# Patient Record
Sex: FEMALE | Race: WHITE | NOT HISPANIC OR LATINO
[De-identification: names, ages, dates, MRNs, and addresses within clinical notes are randomized per-mention and may not be internally consistent; named-entity substitution may affect disease eponyms.]

---

## 2022-06-27 ENCOUNTER — APPOINTMENT (OUTPATIENT)
Dept: ORTHOPEDIC SURGERY | Facility: CLINIC | Age: 82
End: 2022-06-27

## 2022-06-27 DIAGNOSIS — Z63.5 DISRUPTION OF FAMILY BY SEPARATION AND DIVORCE: ICD-10-CM

## 2022-06-27 DIAGNOSIS — Z78.9 OTHER SPECIFIED HEALTH STATUS: ICD-10-CM

## 2022-06-27 DIAGNOSIS — Z86.79 PERSONAL HISTORY OF OTHER DISEASES OF THE CIRCULATORY SYSTEM: ICD-10-CM

## 2022-06-27 DIAGNOSIS — Z86.39 PERSONAL HISTORY OF OTHER ENDOCRINE, NUTRITIONAL AND METABOLIC DISEASE: ICD-10-CM

## 2022-06-27 PROBLEM — Z00.00 ENCOUNTER FOR PREVENTIVE HEALTH EXAMINATION: Status: ACTIVE | Noted: 2022-06-27

## 2022-06-27 PROCEDURE — 73030 X-RAY EXAM OF SHOULDER: CPT | Mod: LT

## 2022-06-27 PROCEDURE — 20610 DRAIN/INJ JOINT/BURSA W/O US: CPT | Mod: LT

## 2022-06-27 PROCEDURE — 99213 OFFICE O/P EST LOW 20 MIN: CPT | Mod: 25

## 2022-06-27 SDOH — SOCIAL STABILITY - SOCIAL INSECURITY: DISRUPTION OF FAMILY BY SEPARATION AND DIVORCE: Z63.5

## 2022-06-27 NOTE — DISCUSSION/SUMMARY
[de-identified] : Discussed NSAIDS, oral medrol, PT, CSI GH, Surgery\par Patient understands if cortisone injection is administered, total joint arthroplasty will need to be delayed  4 months. \par \par Plan is to go forward with CSI today in the left shoulder.\par \par f/u 1 month

## 2022-06-27 NOTE — HISTORY OF PRESENT ILLNESS
[de-identified] : Patient is here today for the left shoulder. SHe states she fell 2 days ago.  Patient has had issues with the shoulder in the past and a few weeks ago she was lifting a bag and noted pain. She notes pain anterior and posterior shoulder.  IN the past she had PT for her shoulder.  She states she has taken NSAIDS in the past and had a H/A as a result.

## 2022-06-27 NOTE — PHYSICAL EXAM
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  [TWNoteComboBox7] : active forward flexion 155 degrees [de-identified] : active abduction 110 degrees [TWNoteComboBox6] : internal rotation L1 [de-identified] : external rotation 30 degrees

## 2022-06-27 NOTE — PROCEDURE
[Large Joint Injection] : Large joint injection [Left] : of the left [Subacromial Space] : subacromial space [Pain] : pain [Inflammation] : inflammation [X-ray evidence of Osteoarthritis on this or prior visit] : x-ray evidence of Osteoarthritis on this or prior visit [Betadine] : betadine [Sterile technique used] : sterile technique used [___ cc    6mg] :  Betamethasone (Celestone) ~Vcc of 6mg [___ cc    1%] : Lidocaine ~Vcc of 1%  [] : Patient tolerated procedure well [Call if redness, pain or fever occur] : call if redness, pain or fever occur [Apply ice for 15min out of every hour for the next 12-24 hours as tolerated] : apply ice for 15 minutes out of every hour for the next 12-24 hours as tolerated [Patient was advised to rest the joint(s) for ____ days] : patient was advised to rest the joint(s) for [unfilled] days [Previous OTC use and PT nontherapeutic] : patient has tried OTC's including aspirin, Ibuprofen, Aleve, etc or prescription NSAIDS, and/or exercises at home and/or physical therapy without satisfactory response [Patient had decreased mobility in the joint] : patient had decreased mobility in the joint [Risks, benefits, alternatives discussed / Verbal consent obtained] : the risks benefits, and alternatives have been discussed, and verbal consent was obtained

## 2022-07-27 ENCOUNTER — APPOINTMENT (OUTPATIENT)
Dept: ORTHOPEDIC SURGERY | Facility: CLINIC | Age: 82
End: 2022-07-27

## 2022-07-27 PROCEDURE — 99213 OFFICE O/P EST LOW 20 MIN: CPT

## 2022-07-27 NOTE — PHYSICAL EXAM
[Left] : left shoulder [5 ___] : forward flexion 5[unfilled]/5 [5___] : internal rotation 5[unfilled]/5 [] : no erythema [TWNoteComboBox7] : active forward flexion 155 degrees [de-identified] : active abduction 110 degrees [TWNoteComboBox6] : internal rotation L1 [de-identified] : external rotation 30 degrees

## 2022-07-27 NOTE — HISTORY OF PRESENT ILLNESS
[de-identified] : following up for the left shoulder. Patient had a CSI 6/27/2022 which provided great relief.  She still notes some soreness posteriolateral shoulder and upper where she hit the cement as well as the base of the Left thumb

## 2023-08-04 ENCOUNTER — OFFICE (OUTPATIENT)
Facility: LOCATION | Age: 83
Setting detail: OPHTHALMOLOGY
End: 2023-08-04
Payer: MEDICARE

## 2023-08-04 ENCOUNTER — RX ONLY (RX ONLY)
Age: 83
End: 2023-08-04

## 2023-08-04 DIAGNOSIS — Z13.5: ICD-10-CM

## 2023-08-04 DIAGNOSIS — H01.004: ICD-10-CM

## 2023-08-04 DIAGNOSIS — H16.223: ICD-10-CM

## 2023-08-04 DIAGNOSIS — H01.005: ICD-10-CM

## 2023-08-04 DIAGNOSIS — H01.001: ICD-10-CM

## 2023-08-04 DIAGNOSIS — H01.002: ICD-10-CM

## 2023-08-04 DIAGNOSIS — H35.363: ICD-10-CM

## 2023-08-04 PROBLEM — H40.033 NARROW ANGLE GLAUCOMA SUSPECT; BOTH EYES: Status: RESOLVED | Noted: 2023-08-04 | Resolved: 2023-08-04

## 2023-08-04 PROCEDURE — 92134 CPTRZ OPH DX IMG PST SGM RTA: CPT | Performed by: OPHTHALMOLOGY

## 2023-08-04 PROCEDURE — 92250 FUNDUS PHOTOGRAPHY W/I&R: CPT | Performed by: OPHTHALMOLOGY

## 2023-08-04 PROCEDURE — 92014 COMPRE OPH EXAM EST PT 1/>: CPT | Performed by: OPHTHALMOLOGY

## 2023-08-04 ASSESSMENT — CONFRONTATIONAL VISUAL FIELD TEST (CVF)
OS_FINDINGS: FULL
OD_FINDINGS: FULL

## 2023-08-04 ASSESSMENT — CORNEAL DYSTROPHY - BAND KERATOPATHY
OD_BANDKERATOPATHY: 1+
OS_BANDKERATOPATHY: 1+

## 2023-08-04 ASSESSMENT — LID POSITION - DERMATOCHALASIS
OS_DERMATOCHALASIS: T
OD_DERMATOCHALASIS: T

## 2023-08-04 ASSESSMENT — LID EXAM ASSESSMENTS
OS_BLEPHARITIS: T
OS_COMMENTS: BLEPHARITIS WITH FROTHING
OD_BLEPHARITIS: T
OD_COMMENTS: BLEPHARITIS WITH FROTHING

## 2023-08-04 ASSESSMENT — SUPERFICIAL PUNCTATE KERATITIS (SPK)
OS_SPK: 1+
OD_SPK: 1+

## 2023-08-04 ASSESSMENT — CORNEAL DYSTROPHY - POSTERIOR
OS_POSTERIORDYSTROPHY: GUTTATA
OD_POSTERIORDYSTROPHY: GUTTATA

## 2023-08-16 PROBLEM — Z13.5: Status: ACTIVE | Noted: 2023-08-04

## 2023-08-17 ASSESSMENT — REFRACTION_CURRENTRX
OD_OVR_VA: 20/
OS_OVR_VA: 20/
OS_CYLINDER: SPHERE
OD_AXIS: 110
OD_SPHERE: -1.25
OS_ADD: +2.50
OD_ADD: +2.50
OD_CYLINDER: +0.50
OS_SPHERE: -1.00

## 2023-08-17 ASSESSMENT — VISUAL ACUITY
OD_BCVA: 20/25+2
OS_BCVA: 20/25+2

## 2023-09-01 ENCOUNTER — RX ONLY (RX ONLY)
Age: 83
End: 2023-09-01

## 2023-09-01 ENCOUNTER — OFFICE (OUTPATIENT)
Facility: LOCATION | Age: 83
Setting detail: OPHTHALMOLOGY
End: 2023-09-01
Payer: MEDICARE

## 2023-09-01 DIAGNOSIS — H01.001: ICD-10-CM

## 2023-09-01 DIAGNOSIS — H01.002: ICD-10-CM

## 2023-09-01 DIAGNOSIS — H16.223: ICD-10-CM

## 2023-09-01 DIAGNOSIS — H01.005: ICD-10-CM

## 2023-09-01 DIAGNOSIS — H01.004: ICD-10-CM

## 2023-09-01 DIAGNOSIS — H02.834: ICD-10-CM

## 2023-09-01 DIAGNOSIS — H02.831: ICD-10-CM

## 2023-09-01 PROBLEM — H35.443 AGE-RELATED RETICULAR DEGENERATION OF RETINA; BOTH EYES: Status: ACTIVE | Noted: 2023-08-04

## 2023-09-01 PROBLEM — H18.513 ENDOTHELIAL CORNEAL DYSTROPHY; BOTH EYES: Status: ACTIVE | Noted: 2023-08-04

## 2023-09-01 PROBLEM — H35.033 HYPERTENSIVE RETINOPATHY; BOTH EYES: Status: ACTIVE | Noted: 2023-08-04

## 2023-09-01 PROBLEM — H33.101 RETINOSCHISIS; RIGHT EYE: Status: ACTIVE | Noted: 2023-08-04

## 2023-09-01 PROBLEM — H43.811 VITREOUS DETACHMENT; RIGHT EYE: Status: ACTIVE | Noted: 2023-08-04

## 2023-09-01 PROBLEM — Z96.1 PSEUDOPHAKIA ; BOTH EYES: Status: ACTIVE | Noted: 2023-08-04

## 2023-09-01 PROBLEM — H43.393 VITREOUS FLOATERS; BOTH EYES: Status: ACTIVE | Noted: 2023-08-04

## 2023-09-01 PROBLEM — H35.363 DRUSEN; BOTH EYES: Status: ACTIVE | Noted: 2023-08-04

## 2023-09-01 PROBLEM — H31.29 PERIPAPILLARY ATROPHY ; BOTH EYES: Status: ACTIVE | Noted: 2023-08-04

## 2023-09-01 PROCEDURE — 99213 OFFICE O/P EST LOW 20 MIN: CPT | Performed by: OPHTHALMOLOGY

## 2023-09-01 ASSESSMENT — LID EXAM ASSESSMENTS
OD_COMMENTS: BLEPHARITIS WITH FROTHING
OS_COMMENTS: BLEPHARITIS WITH FROTHING
OD_BLEPHARITIS: T
OS_BLEPHARITIS: T

## 2023-09-01 ASSESSMENT — CORNEAL DYSTROPHY - BAND KERATOPATHY
OD_BANDKERATOPATHY: 1+
OS_BANDKERATOPATHY: 1+

## 2023-09-01 ASSESSMENT — CONFRONTATIONAL VISUAL FIELD TEST (CVF)
OD_FINDINGS: FULL
OS_FINDINGS: FULL

## 2023-09-01 ASSESSMENT — CORNEAL DYSTROPHY - POSTERIOR
OS_POSTERIORDYSTROPHY: GUTTATA
OD_POSTERIORDYSTROPHY: GUTTATA

## 2023-09-01 ASSESSMENT — LID POSITION - DERMATOCHALASIS
OD_DERMATOCHALASIS: T
OS_DERMATOCHALASIS: T

## 2023-09-01 ASSESSMENT — SUPERFICIAL PUNCTATE KERATITIS (SPK)
OS_SPK: 1+
OD_SPK: 1+

## 2023-09-06 ASSESSMENT — REFRACTION_CURRENTRX
OD_CYLINDER: +0.50
OD_AXIS: 110
OD_SPHERE: -1.25
OS_SPHERE: -1.00
OS_OVR_VA: 20/
OD_ADD: +2.50
OD_OVR_VA: 20/
OS_ADD: +2.50
OS_CYLINDER: SPHERE

## 2023-09-06 ASSESSMENT — VISUAL ACUITY
OD_BCVA: 20/20-1
OS_BCVA: 20/20-1

## 2023-12-22 ENCOUNTER — OFFICE (OUTPATIENT)
Facility: LOCATION | Age: 83
Setting detail: OPHTHALMOLOGY
End: 2023-12-22
Payer: MEDICARE

## 2023-12-22 DIAGNOSIS — H01.005: ICD-10-CM

## 2023-12-22 DIAGNOSIS — H01.001: ICD-10-CM

## 2023-12-22 DIAGNOSIS — H16.223: ICD-10-CM

## 2023-12-22 DIAGNOSIS — H01.002: ICD-10-CM

## 2023-12-22 DIAGNOSIS — H01.004: ICD-10-CM

## 2023-12-22 PROCEDURE — 99213 OFFICE O/P EST LOW 20 MIN: CPT | Performed by: OPHTHALMOLOGY

## 2023-12-22 ASSESSMENT — CORNEAL DYSTROPHY - POSTERIOR
OS_POSTERIORDYSTROPHY: GUTTATA
OD_POSTERIORDYSTROPHY: GUTTATA

## 2023-12-22 ASSESSMENT — CONFRONTATIONAL VISUAL FIELD TEST (CVF)
OD_FINDINGS: FULL
OS_FINDINGS: FULL

## 2023-12-22 ASSESSMENT — SUPERFICIAL PUNCTATE KERATITIS (SPK)
OS_SPK: 1+
OD_SPK: 1+

## 2023-12-22 ASSESSMENT — LID POSITION - DERMATOCHALASIS
OD_DERMATOCHALASIS: T
OS_DERMATOCHALASIS: T

## 2023-12-22 ASSESSMENT — CORNEAL DYSTROPHY - BAND KERATOPATHY
OS_BANDKERATOPATHY: 1+
OD_BANDKERATOPATHY: 1+

## 2023-12-27 ASSESSMENT — REFRACTION_CURRENTRX
OD_ADD: +2.50
OD_AXIS: 110
OD_SPHERE: -1.25
OS_SPHERE: -1.00
OD_OVR_VA: 20/
OD_CYLINDER: +0.50
OS_CYLINDER: SPHERE
OS_OVR_VA: 20/
OS_ADD: +2.50

## 2024-02-05 ENCOUNTER — APPOINTMENT (OUTPATIENT)
Dept: ORTHOPEDIC SURGERY | Facility: CLINIC | Age: 84
End: 2024-02-05
Payer: MEDICARE

## 2024-02-05 VITALS — HEIGHT: 63 IN | BODY MASS INDEX: 24.8 KG/M2 | WEIGHT: 140 LBS

## 2024-02-05 DIAGNOSIS — E78.00 PURE HYPERCHOLESTEROLEMIA, UNSPECIFIED: ICD-10-CM

## 2024-02-05 DIAGNOSIS — J45.909 UNSPECIFIED ASTHMA, UNCOMPLICATED: ICD-10-CM

## 2024-02-05 DIAGNOSIS — M19.012 PRIMARY OSTEOARTHRITIS, LEFT SHOULDER: ICD-10-CM

## 2024-02-05 PROCEDURE — 72040 X-RAY EXAM NECK SPINE 2-3 VW: CPT

## 2024-02-05 PROCEDURE — 99214 OFFICE O/P EST MOD 30 MIN: CPT | Mod: 25

## 2024-02-05 PROCEDURE — 20526 THER INJECTION CARP TUNNEL: CPT | Mod: LT

## 2024-02-05 RX ORDER — ALBUTEROL SULFATE 1.25 MG/3ML
1.25 SOLUTION RESPIRATORY (INHALATION)
Refills: 0 | Status: ACTIVE | COMMUNITY

## 2024-02-05 RX ORDER — LEVOTHYROXINE SODIUM 200 UG/1
CAPSULE ORAL
Refills: 0 | Status: ACTIVE | COMMUNITY

## 2024-02-05 NOTE — PHYSICAL EXAM
[de-identified] : Constitutional: The patient appears well developed, well nourished. Examination of patients ability to communicate functionally was normal.       Neurologic: Coordination is normal. Alert and oriented to time, place and person. No evidence of mood disorder, calm affect.          LEFT   SHOULDER: Inspection of the shoulder/upper arm is as follows: no swelling, no erythema, no ecchymosis, no atrophy and no deformity.       Palpation of the shoulder/upper arm is as follows: Tenderness is noted at the anterior shoulder and lateral shoulder and bicipital groove       Range of motion of the shoulder is as follows in degrees:   Pain with internal rotation, external rotation, abduction, and forward flexion.       active forward flexion to: 170    active abduction to: 160     internal rotation to: L4    external rotation with arm to side: 40      Strength of the shoulder is as follows:       Forward flexion 5/5   Abduction 5/5   External Rotation 5/5     Internal Rotation 5/5       Ligament Stability and Special Tests of the shoulder is as follows: Impingement testing is positive. Hawkin's testing is positive. There is positive arc of pain. Shoulder apprehension shows to be negative. Shoulder relocation is negative. Drop-arm testing is negative.       Neurological testing of the shoulder is as follows: reflexes intact, No sensory deficits, motor and sensor intact distally and no scapular winging.     Constitutional: The patient appears well developed, well nourished. Examination of patients ability to communicate functionally was normal.       Neurologic: Coordination is normal. Alert and oriented to time, place and person. No evidence of mood disorder, calm affect.        LEFT     HAND: Inspection of the hand/wrist is as follows: thenar atrophy. No swelling, ecchymosis, erythema, lacerations/abrasions, rashes, masses, deformity, nail deformity and clubbing of fingers.       Palpation of the hand/wrist is as follows: No tenderness over wrist or hand, and no palpable masses or nodules.       Range of motion of the hand/wrist is as follows in degrees:   Wrist dorsiflexion:  75    volarflexion:  85    radial deviation:  25     ulnar deviation:  40     full range of motion of wrist, full range of motion of hand, no pain with range of motion and good active flexion and extension of all finger joints.       Strength testing of the hand/wrist is as follows:    Wrist dorsiflexion strength:  5/5    Wrist volarflexion strength:   5/5    Grasp strength:   5/5    Finger abductor strength: 5/5     Pinch strength:  5/5      Special testing of the hand/wrist is as follows: positive Tinel's test and positive Phalen's test       Neurological testing of the hand/wrist is as follows: Decreased sensation to light touch over 1st finger, 2nd finger and 3rd finger.       Motor exam 5/5 about wrist, Motor exam 5/5 about hand, no focal motor deficits, palpable radial pulse and good capillary refill in all fingers.

## 2024-02-05 NOTE — DISCUSSION/SUMMARY
[de-identified] : Extensive discussion of the options was had with the patient. This discussion included both surgical and nonsurgical options. Options including but not limited to cortisone injection, viscosupplementation, physical therapy, oral anti-inflammatories, MRI, arthroplasty VS arthroscopy were discussed with the patient. We also discussed the option of observation, allowing the patient to continue rest, ice, NSAIDs and following up if the condition does not improve. Time was taken to go over any questions the patient had in regards to any of the treatment plans described. She has a component of carpal tunnel. She will have CSI for the left carpal tunnel. She also has DDD of the cervical spine. She will f/u in 1 mos.

## 2024-02-05 NOTE — HISTORY OF PRESENT ILLNESS
[de-identified] : Patient presents for LT shoulder pan that radiates down to the hand. Patient was being treated for LT shoulder arthritis in 2022. Patient is having pins and needles down the arm.  Patient was seen and given CSI Left shoulder with good relief. She states recently she has noted upper arm and shoulder pain with tingling to all 5 digits and pain at the base of the thumb.

## 2024-02-05 NOTE — PROCEDURE
[Carpal Tunnel] : carpal tunnel [Left] : of the left [Pain] : pain [Inflammation] : inflammation [Betadine] : betadine [Sterile technique used] : sterile technique used [___ cc    6mg] :  Betamethasone (Celestone) ~Vcc of 6mg [___ cc    1%] : Lidocaine ~Vcc of 1%  [] : Patient tolerated procedure well [Call if redness, pain or fever occur] : call if redness, pain or fever occur [Apply ice for 15min out of every hour for the next 12-24 hours as tolerated] : apply ice for 15 minutes out of every hour for the next 12-24 hours as tolerated [Previous OTC use and PT nontherapeutic] : patient has tried OTC's including aspirin, Ibuprofen, Aleve, etc or prescription NSAIDS, and/or exercises at home and/or physical therapy without satisfactory response [Patient had decreased mobility in the joint] : patient had decreased mobility in the joint [Risks, benefits, alternatives discussed / Verbal consent obtained] : the risks benefits, and alternatives have been discussed, and verbal consent was obtained

## 2024-03-04 ENCOUNTER — APPOINTMENT (OUTPATIENT)
Dept: ORTHOPEDIC SURGERY | Facility: CLINIC | Age: 84
End: 2024-03-04
Payer: MEDICARE

## 2024-03-04 DIAGNOSIS — G56.02 CARPAL TUNNEL SYNDROME, LEFT UPPER LIMB: ICD-10-CM

## 2024-03-04 PROCEDURE — 99213 OFFICE O/P EST LOW 20 MIN: CPT

## 2024-03-04 RX ORDER — ASPIRIN 81 MG
81 TABLET, DELAYED RELEASE (ENTERIC COATED) ORAL
Refills: 0 | Status: DISCONTINUED | COMMUNITY
End: 2024-03-04

## 2024-03-04 RX ORDER — METOPROLOL TARTRATE 75 MG/1
TABLET, FILM COATED ORAL
Refills: 0 | Status: ACTIVE | COMMUNITY

## 2024-03-04 RX ORDER — FLUTICASONE PROPIONATE 220 UG/1
AEROSOL, METERED RESPIRATORY (INHALATION)
Refills: 0 | Status: ACTIVE | COMMUNITY

## 2024-03-04 RX ORDER — ASPIRIN 325 MG/1
TABLET, FILM COATED ORAL
Refills: 0 | Status: ACTIVE | COMMUNITY

## 2024-03-04 NOTE — PHYSICAL EXAM
[de-identified] :      LEFT     HAND: Inspection of the hand/wrist is as follows: thenar atrophy. No swelling, ecchymosis, erythema, lacerations/abrasions, rashes, masses, deformity, nail deformity and clubbing of fingers.       Palpation of the hand/wrist is as follows: No tenderness over wrist or hand, and no palpable masses or nodules.       Range of motion of the hand/wrist is as follows in degrees:   Wrist dorsiflexion:  75    volarflexion:  85    radial deviation:  25     ulnar deviation:  40     full range of motion of wrist, full range of motion of hand, no pain with range of motion and good active flexion and extension of all finger joints.       Strength testing of the hand/wrist is as follows:    Wrist dorsiflexion strength:  5/5    Wrist volarflexion strength:   5/5    Grasp strength:   5/5    Finger abductor strength: 5/5     Pinch strength:  5/5      Special testing of the hand/wrist is as follows: positive Tinel's test and positive Phalen's test       Neurological testing of the hand/wrist is as follows: Decreased sensation to light touch over 1st finger, 2nd finger and 3rd finger.       Motor exam 5/5 about wrist, Motor exam 5/5 about hand, no focal motor deficits, palpable radial pulse and good capillary refill in all fingers.

## 2024-03-04 NOTE — HISTORY OF PRESENT ILLNESS
[de-identified] : following up for the left hand. Patient had CSI for the left carpal tunnel 1 mos. ago .  Patient states the shot gave her very good relief. She notes less pain and denies any paresthesias. She admits she was crocheting and she states she irritated her thumb base.  She states she recently bumped into someone with the Left shoulder and aggravated it.

## 2024-06-25 ENCOUNTER — OFFICE (OUTPATIENT)
Facility: LOCATION | Age: 84
Setting detail: OPHTHALMOLOGY
End: 2024-06-25
Payer: MEDICARE

## 2024-06-25 DIAGNOSIS — H01.004: ICD-10-CM

## 2024-06-25 DIAGNOSIS — H10.433: ICD-10-CM

## 2024-06-25 DIAGNOSIS — H35.033: ICD-10-CM

## 2024-06-25 DIAGNOSIS — H33.101: ICD-10-CM

## 2024-06-25 DIAGNOSIS — H01.001: ICD-10-CM

## 2024-06-25 DIAGNOSIS — Z13.5: ICD-10-CM

## 2024-06-25 DIAGNOSIS — H01.005: ICD-10-CM

## 2024-06-25 DIAGNOSIS — H01.002: ICD-10-CM

## 2024-06-25 PROBLEM — H02.83 DERMATOCHALASIS; RIGHT UPPER LID, LEFT UPPER LID: Status: ACTIVE | Noted: 2024-06-25

## 2024-06-25 PROCEDURE — 92014 COMPRE OPH EXAM EST PT 1/>: CPT | Performed by: OPHTHALMOLOGY

## 2024-06-25 PROCEDURE — 92250 FUNDUS PHOTOGRAPHY W/I&R: CPT | Mod: GY | Performed by: OPHTHALMOLOGY

## 2024-06-25 ASSESSMENT — LID POSITION - DERMATOCHALASIS
OS_DERMATOCHALASIS: T
OD_DERMATOCHALASIS: T

## 2024-06-25 ASSESSMENT — CONFRONTATIONAL VISUAL FIELD TEST (CVF)
OD_FINDINGS: FULL
OS_FINDINGS: FULL

## 2024-06-25 ASSESSMENT — LID EXAM ASSESSMENTS
OS_COMMENTS: BLEPHARITIS WITH FROTHING
OS_BLEPHARITIS: LLL LUL T
OD_BLEPHARITIS: RLL RUL T
OD_COMMENTS: BLEPHARITIS WITH FROTHING

## 2024-09-04 ENCOUNTER — APPOINTMENT (OUTPATIENT)
Dept: ORTHOPEDIC SURGERY | Facility: CLINIC | Age: 84
End: 2024-09-04
Payer: MEDICARE

## 2024-09-04 DIAGNOSIS — M25.542 PAIN IN JOINTS OF LEFT HAND: ICD-10-CM

## 2024-09-04 DIAGNOSIS — G56.02 CARPAL TUNNEL SYNDROME, LEFT UPPER LIMB: ICD-10-CM

## 2024-09-04 PROCEDURE — 99214 OFFICE O/P EST MOD 30 MIN: CPT | Mod: 25

## 2024-09-04 PROCEDURE — 20526 THER INJECTION CARP TUNNEL: CPT | Mod: LT

## 2024-09-04 PROCEDURE — 73130 X-RAY EXAM OF HAND: CPT | Mod: LT

## 2024-09-04 NOTE — PROCEDURE
[Carpal Tunnel] : carpal tunnel [Left] : of the left [Pain] : pain [Inflammation] : inflammation [Betadine] : betadine [Sterile technique used] : sterile technique used [___ cc    6mg] :  Betamethasone (Celestone) ~Vcc of 6mg [___ cc    1%] : Lidocaine ~Vcc of 1%  [] : Patient tolerated procedure well [Call if redness, pain or fever occur] : call if redness, pain or fever occur [Apply ice for 15min out of every hour for the next 12-24 hours as tolerated] : apply ice for 15 minutes out of every hour for the next 12-24 hours as tolerated [Risks, benefits, alternatives discussed / Verbal consent obtained] : the risks benefits, and alternatives have been discussed, and verbal consent was obtained

## 2024-09-04 NOTE — PHYSICAL EXAM
[Left] : left hand [de-identified] :     LEFT     HAND: Inspection of the hand/wrist is as follows: thenar atrophy. No swelling, ecchymosis, erythema, lacerations/abrasions, rashes, masses, deformity, nail deformity and clubbing of fingers.       Palpation of the hand/wrist is as follows: No tenderness over wrist or hand, and no palpable masses or nodules.     POSITIVE TTP 1st CMC JOINT  Range of motion of the hand/wrist is as follows in degrees:   Wrist dorsiflexion:  75    volarflexion:  85    radial deviation:  25     ulnar deviation:  40     full range of motion of wrist, full range of motion of hand, no pain with range of motion and good active flexion and extension of all finger joints.       Strength testing of the hand/wrist is as follows:    Wrist dorsiflexion strength:  5/5    Wrist volarflexion strength:   5/5    Grasp strength:   5/5    Finger abductor strength: 5/5     Pinch strength:  5/5      Special testing of the hand/wrist is as follows: positive Tinel's test and positive Phalen's test       Neurological testing of the hand/wrist is as follows: Decreased sensation to light touch over 1st finger, 2nd finger and 3rd finger.       Motor exam 5/5 about wrist, Motor exam 5/5 about hand, no focal motor deficits, palpable radial pulse and good capillary refill in all fingers.      [FreeTextEntry1] : ap/lat/oblique show signficant DIP DJD and 1st CMC joint space narrowing. no frx noted.

## 2024-09-04 NOTE — HISTORY OF PRESENT ILLNESS
[de-identified] : Follow up for left hand pain. Patient reports she was lifting something heavy with her hand about 4 days ago and has pain now.  She now has pain to the base of the Left thumb and radiation in the thumb and tingling to all 5 digits of the hand.  She had CSI CT in Feb which gave her great relief.

## 2024-12-10 ENCOUNTER — OFFICE (OUTPATIENT)
Facility: LOCATION | Age: 84
Setting detail: OPHTHALMOLOGY
End: 2024-12-10
Payer: MEDICARE

## 2024-12-10 DIAGNOSIS — H16.223: ICD-10-CM

## 2024-12-10 DIAGNOSIS — H35.363: ICD-10-CM

## 2024-12-10 DIAGNOSIS — H10.433: ICD-10-CM

## 2024-12-10 DIAGNOSIS — H40.033: ICD-10-CM

## 2024-12-10 DIAGNOSIS — G43.809: ICD-10-CM

## 2024-12-10 PROCEDURE — 92134 CPTRZ OPH DX IMG PST SGM RTA: CPT | Performed by: OPHTHALMOLOGY

## 2024-12-10 PROCEDURE — 92014 COMPRE OPH EXAM EST PT 1/>: CPT | Performed by: OPHTHALMOLOGY

## 2024-12-10 ASSESSMENT — SUPERFICIAL PUNCTATE KERATITIS (SPK)
OS_SPK: 1+
OD_SPK: 1+

## 2024-12-10 ASSESSMENT — LID EXAM ASSESSMENTS
OD_BLEPHARITIS: RLL RUL T
OD_COMMENTS: BLEPHARITIS WITH FROTHING
OS_COMMENTS: BLEPHARITIS WITH FROTHING
OS_BLEPHARITIS: LLL LUL T

## 2024-12-10 ASSESSMENT — LID POSITION - DERMATOCHALASIS
OD_DERMATOCHALASIS: T
OS_DERMATOCHALASIS: T

## 2024-12-10 ASSESSMENT — CORNEAL DYSTROPHY - BAND KERATOPATHY
OS_BANDKERATOPATHY: 1+
OD_BANDKERATOPATHY: 1+

## 2024-12-10 ASSESSMENT — CONFRONTATIONAL VISUAL FIELD TEST (CVF)
OD_FINDINGS: FULL
OS_FINDINGS: FULL

## 2024-12-10 ASSESSMENT — CORNEAL DYSTROPHY - POSTERIOR
OS_POSTERIORDYSTROPHY: GUTTATA
OD_POSTERIORDYSTROPHY: GUTTATA

## 2024-12-12 ASSESSMENT — REFRACTION_CURRENTRX
OD_AXIS: 110
OS_OVR_VA: 20/
OS_ADD: +2.50
OD_ADD: +2.50
OD_VPRISM_DIRECTION: BF
OD_CYLINDER: +0.50
OS_SPHERE: -1.00
OD_SPHERE: -1.25
OD_OVR_VA: 20/
OS_VPRISM_DIRECTION: BF
OS_CYLINDER: SPHERE

## 2024-12-12 ASSESSMENT — REFRACTION_AUTOREFRACTION
OD_SPHERE: -0.25
OS_AXIS: 27
OD_CYLINDER: +1.25
OD_AXIS: 173
OS_SPHERE: -0.75
OS_CYLINDER: +1.50

## 2024-12-12 ASSESSMENT — VISUAL ACUITY
OD_BCVA: 20/20-2
OS_BCVA: 20/30+2

## 2024-12-24 ENCOUNTER — OFFICE (OUTPATIENT)
Facility: LOCATION | Age: 84
Setting detail: OPHTHALMOLOGY
End: 2024-12-24
Payer: MEDICARE

## 2024-12-24 DIAGNOSIS — G43.809: ICD-10-CM

## 2024-12-24 DIAGNOSIS — H16.223: ICD-10-CM

## 2024-12-24 DIAGNOSIS — H10.433: ICD-10-CM

## 2024-12-24 PROCEDURE — 99213 OFFICE O/P EST LOW 20 MIN: CPT | Performed by: OPHTHALMOLOGY

## 2024-12-24 ASSESSMENT — SUPERFICIAL PUNCTATE KERATITIS (SPK)
OS_SPK: 1+
OD_SPK: 1+

## 2024-12-24 ASSESSMENT — CORNEAL DYSTROPHY - BAND KERATOPATHY
OD_BANDKERATOPATHY: 1+
OS_BANDKERATOPATHY: 1+

## 2024-12-24 ASSESSMENT — LID EXAM ASSESSMENTS
OS_COMMENTS: BLEPHARITIS WITH FROTHING
OD_COMMENTS: BLEPHARITIS WITH FROTHING
OD_BLEPHARITIS: RLL RUL T
OS_BLEPHARITIS: LLL LUL T

## 2024-12-24 ASSESSMENT — CORNEAL DYSTROPHY - POSTERIOR
OS_POSTERIORDYSTROPHY: GUTTATA
OD_POSTERIORDYSTROPHY: GUTTATA

## 2024-12-24 ASSESSMENT — LID POSITION - DERMATOCHALASIS
OD_DERMATOCHALASIS: T
OS_DERMATOCHALASIS: T

## 2024-12-28 PROBLEM — G43.809 OCULAR MIGRAINE W/OUT INTRACTABLE: Status: ACTIVE | Noted: 2024-12-24

## 2024-12-28 ASSESSMENT — REFRACTION_CURRENTRX
OD_ADD: +2.50
OD_CYLINDER: +0.50
OD_OVR_VA: 20/
OD_VPRISM_DIRECTION: BF
OD_SPHERE: -1.25
OS_OVR_VA: 20/
OS_ADD: +2.50
OS_SPHERE: -1.00
OS_CYLINDER: SPHERE
OS_VPRISM_DIRECTION: BF
OD_AXIS: 110

## 2024-12-28 ASSESSMENT — REFRACTION_AUTOREFRACTION
OD_SPHERE: -0.25
OD_AXIS: 173
OS_AXIS: 27
OS_SPHERE: -0.75
OS_CYLINDER: +1.50
OD_CYLINDER: +1.25

## 2024-12-28 ASSESSMENT — VISUAL ACUITY
OS_BCVA: 20/25
OD_BCVA: 20/30+2

## 2025-07-18 ENCOUNTER — RX ONLY (RX ONLY)
Age: 85
End: 2025-07-18

## 2025-07-18 ENCOUNTER — OFFICE (OUTPATIENT)
Facility: LOCATION | Age: 85
Setting detail: OPHTHALMOLOGY
End: 2025-07-18
Payer: MEDICARE

## 2025-07-18 DIAGNOSIS — H43.811: ICD-10-CM

## 2025-07-18 DIAGNOSIS — Z13.5: ICD-10-CM

## 2025-07-18 DIAGNOSIS — H10.433: ICD-10-CM

## 2025-07-18 DIAGNOSIS — H35.363: ICD-10-CM

## 2025-07-18 DIAGNOSIS — G43.B0: ICD-10-CM

## 2025-07-18 PROCEDURE — 92014 COMPRE OPH EXAM EST PT 1/>: CPT | Performed by: OPHTHALMOLOGY

## 2025-07-18 PROCEDURE — 92134 CPTRZ OPH DX IMG PST SGM RTA: CPT | Performed by: OPHTHALMOLOGY

## 2025-07-18 PROCEDURE — 92250 FUNDUS PHOTOGRAPHY W/I&R: CPT | Mod: GY | Performed by: OPHTHALMOLOGY

## 2025-07-18 ASSESSMENT — CORNEAL DYSTROPHY - BAND KERATOPATHY
OD_BANDKERATOPATHY: 1+
OS_BANDKERATOPATHY: 1+

## 2025-07-18 ASSESSMENT — LID EXAM ASSESSMENTS
OS_BLEPHARITIS: LLL LUL T
OS_COMMENTS: BLEPHARITIS WITH FROTHING
OD_BLEPHARITIS: RLL RUL T
OD_COMMENTS: BLEPHARITIS WITH FROTHING

## 2025-07-18 ASSESSMENT — TONOMETRY
OS_IOP_MMHG: 17
OD_IOP_MMHG: 18

## 2025-07-18 ASSESSMENT — LID POSITION - DERMATOCHALASIS
OD_DERMATOCHALASIS: T
OS_DERMATOCHALASIS: T

## 2025-07-18 ASSESSMENT — CORNEAL DYSTROPHY - POSTERIOR
OD_POSTERIORDYSTROPHY: GUTTATA
OS_POSTERIORDYSTROPHY: GUTTATA

## 2025-07-18 ASSESSMENT — CONFRONTATIONAL VISUAL FIELD TEST (CVF)
OS_FINDINGS: FULL
OD_FINDINGS: FULL

## 2025-07-18 ASSESSMENT — SUPERFICIAL PUNCTATE KERATITIS (SPK)
OS_SPK: 1+
OD_SPK: 1+

## 2025-07-21 PROBLEM — G43.B0 OCULAR MIGRAINE W/OUT INTRACTABLE: Status: ACTIVE | Noted: 2025-07-18

## 2025-07-21 ASSESSMENT — REFRACTION_AUTOREFRACTION
OS_CYLINDER: +1.25
OS_SPHERE: -0.75
OD_CYLINDER: +1.00
OD_AXIS: 167
OD_SPHERE: -0.50
OS_AXIS: 020

## 2025-07-21 ASSESSMENT — REFRACTION_CURRENTRX
OD_ADD: +2.50
OS_ADD: +2.50
OS_VPRISM_DIRECTION: BF
OD_AXIS: 110
OD_OVR_VA: 20/
OS_OVR_VA: 20/
OS_SPHERE: -1.00
OD_SPHERE: -1.25
OS_CYLINDER: SPHERE
OD_VPRISM_DIRECTION: BF
OD_CYLINDER: +0.50

## 2025-07-21 ASSESSMENT — VISUAL ACUITY
OS_BCVA: 20/30+2
OD_BCVA: 20/30+2